# Patient Record
Sex: FEMALE | Race: WHITE | NOT HISPANIC OR LATINO | Employment: UNEMPLOYED
[De-identification: names, ages, dates, MRNs, and addresses within clinical notes are randomized per-mention and may not be internally consistent; named-entity substitution may affect disease eponyms.]

---

## 2019-05-03 ENCOUNTER — RECORDS - HEALTHEAST (OUTPATIENT)
Dept: ADMINISTRATIVE | Facility: OTHER | Age: 24
End: 2019-05-03

## 2023-12-03 ENCOUNTER — APPOINTMENT (OUTPATIENT)
Dept: RADIOLOGY | Facility: CLINIC | Age: 28
End: 2023-12-03
Attending: PHYSICIAN ASSISTANT
Payer: COMMERCIAL

## 2023-12-03 ENCOUNTER — HOSPITAL ENCOUNTER (EMERGENCY)
Facility: CLINIC | Age: 28
Discharge: HOME | End: 2023-12-03
Attending: STUDENT IN AN ORGANIZED HEALTH CARE EDUCATION/TRAINING PROGRAM | Admitting: STUDENT IN AN ORGANIZED HEALTH CARE EDUCATION/TRAINING PROGRAM
Payer: COMMERCIAL

## 2023-12-03 ENCOUNTER — APPOINTMENT (OUTPATIENT)
Dept: CT IMAGING | Facility: CLINIC | Age: 28
End: 2023-12-03
Attending: PHYSICIAN ASSISTANT
Payer: COMMERCIAL

## 2023-12-03 VITALS
HEIGHT: 59 IN | RESPIRATION RATE: 20 BRPM | WEIGHT: 195 LBS | DIASTOLIC BLOOD PRESSURE: 66 MMHG | TEMPERATURE: 99.6 F | OXYGEN SATURATION: 94 % | BODY MASS INDEX: 39.31 KG/M2 | SYSTOLIC BLOOD PRESSURE: 116 MMHG | HEART RATE: 92 BPM

## 2023-12-03 DIAGNOSIS — B95.0 GROUP A STREPTOCOCCAL INFECTION: Primary | ICD-10-CM

## 2023-12-03 DIAGNOSIS — J02.9 PHARYNGITIS: ICD-10-CM

## 2023-12-03 PROBLEM — J45.20 MILD INTERMITTENT ASTHMA WITHOUT COMPLICATION: Status: ACTIVE | Noted: 2022-03-18

## 2023-12-03 LAB
ANION GAP SERPL CALCULATED.3IONS-SCNC: 12 MMOL/L (ref 7–15)
BASOPHILS # BLD AUTO: 0.1 10E3/UL (ref 0–0.2)
BASOPHILS NFR BLD AUTO: 0 %
BUN SERPL-MCNC: 4.7 MG/DL (ref 6–20)
CALCIUM SERPL-MCNC: 9.5 MG/DL (ref 8.6–10)
CHLORIDE SERPL-SCNC: 98 MMOL/L (ref 98–107)
CREAT SERPL-MCNC: 0.47 MG/DL (ref 0.51–0.95)
D DIMER PPP FEU-MCNC: 0.57 UG/ML FEU (ref 0–0.5)
DEPRECATED HCO3 PLAS-SCNC: 26 MMOL/L (ref 22–29)
EGFRCR SERPLBLD CKD-EPI 2021: >90 ML/MIN/1.73M2
EOSINOPHIL # BLD AUTO: 0.2 10E3/UL (ref 0–0.7)
EOSINOPHIL NFR BLD AUTO: 1 %
ERYTHROCYTE [DISTWIDTH] IN BLOOD BY AUTOMATED COUNT: 11.8 % (ref 10–15)
FLUAV RNA SPEC QL NAA+PROBE: NEGATIVE
FLUBV RNA RESP QL NAA+PROBE: NEGATIVE
GLUCOSE SERPL-MCNC: 155 MG/DL (ref 70–99)
GROUP A STREP BY PCR: DETECTED
HCG INTACT+B SERPL-ACNC: <1 MIU/ML
HCT VFR BLD AUTO: 35.5 % (ref 35–47)
HGB BLD-MCNC: 12.2 G/DL (ref 11.7–15.7)
IMM GRANULOCYTES # BLD: 0.1 10E3/UL
IMM GRANULOCYTES NFR BLD: 1 %
LACTATE SERPL-SCNC: 1.9 MMOL/L (ref 0.7–2)
LYMPHOCYTES # BLD AUTO: 1.2 10E3/UL (ref 0.8–5.3)
LYMPHOCYTES NFR BLD AUTO: 6 %
MCH RBC QN AUTO: 30.7 PG (ref 26.5–33)
MCHC RBC AUTO-ENTMCNC: 34.4 G/DL (ref 31.5–36.5)
MCV RBC AUTO: 89 FL (ref 78–100)
MONOCYTES # BLD AUTO: 0.8 10E3/UL (ref 0–1.3)
MONOCYTES NFR BLD AUTO: 5 %
MONOCYTES NFR BLD AUTO: NEGATIVE %
NEUTROPHILS # BLD AUTO: 16.5 10E3/UL (ref 1.6–8.3)
NEUTROPHILS NFR BLD AUTO: 87 %
NRBC # BLD AUTO: 0 10E3/UL
NRBC BLD AUTO-RTO: 0 /100
PLATELET # BLD AUTO: 328 10E3/UL (ref 150–450)
POTASSIUM SERPL-SCNC: 3.5 MMOL/L (ref 3.4–5.3)
RBC # BLD AUTO: 3.97 10E6/UL (ref 3.8–5.2)
RSV RNA SPEC NAA+PROBE: NEGATIVE
SARS-COV-2 RNA RESP QL NAA+PROBE: NEGATIVE
SODIUM SERPL-SCNC: 136 MMOL/L (ref 135–145)
WBC # BLD AUTO: 18.9 10E3/UL (ref 4–11)

## 2023-12-03 PROCEDURE — 36415 COLL VENOUS BLD VENIPUNCTURE: CPT | Performed by: PHYSICIAN ASSISTANT

## 2023-12-03 PROCEDURE — 84702 CHORIONIC GONADOTROPIN TEST: CPT | Performed by: PHYSICIAN ASSISTANT

## 2023-12-03 PROCEDURE — 96365 THER/PROPH/DIAG IV INF INIT: CPT | Mod: 59

## 2023-12-03 PROCEDURE — 71275 CT ANGIOGRAPHY CHEST: CPT

## 2023-12-03 PROCEDURE — 96375 TX/PRO/DX INJ NEW DRUG ADDON: CPT | Mod: 59

## 2023-12-03 PROCEDURE — 258N000003 HC RX IP 258 OP 636: Performed by: PHYSICIAN ASSISTANT

## 2023-12-03 PROCEDURE — 86308 HETEROPHILE ANTIBODY SCREEN: CPT | Performed by: PHYSICIAN ASSISTANT

## 2023-12-03 PROCEDURE — 87637 SARSCOV2&INF A&B&RSV AMP PRB: CPT | Performed by: STUDENT IN AN ORGANIZED HEALTH CARE EDUCATION/TRAINING PROGRAM

## 2023-12-03 PROCEDURE — 83605 ASSAY OF LACTIC ACID: CPT | Performed by: PHYSICIAN ASSISTANT

## 2023-12-03 PROCEDURE — 87637 SARSCOV2&INF A&B&RSV AMP PRB: CPT | Performed by: EMERGENCY MEDICINE

## 2023-12-03 PROCEDURE — 250N000011 HC RX IP 250 OP 636: Performed by: STUDENT IN AN ORGANIZED HEALTH CARE EDUCATION/TRAINING PROGRAM

## 2023-12-03 PROCEDURE — 85025 COMPLETE CBC W/AUTO DIFF WBC: CPT | Performed by: PHYSICIAN ASSISTANT

## 2023-12-03 PROCEDURE — 80048 BASIC METABOLIC PNL TOTAL CA: CPT | Performed by: PHYSICIAN ASSISTANT

## 2023-12-03 PROCEDURE — 96361 HYDRATE IV INFUSION ADD-ON: CPT

## 2023-12-03 PROCEDURE — 87651 STREP A DNA AMP PROBE: CPT | Performed by: EMERGENCY MEDICINE

## 2023-12-03 PROCEDURE — 87651 STREP A DNA AMP PROBE: CPT | Performed by: STUDENT IN AN ORGANIZED HEALTH CARE EDUCATION/TRAINING PROGRAM

## 2023-12-03 PROCEDURE — 99285 EMERGENCY DEPT VISIT HI MDM: CPT | Mod: 25

## 2023-12-03 PROCEDURE — 250N000011 HC RX IP 250 OP 636: Mod: JZ | Performed by: PHYSICIAN ASSISTANT

## 2023-12-03 PROCEDURE — 85379 FIBRIN DEGRADATION QUANT: CPT | Performed by: PHYSICIAN ASSISTANT

## 2023-12-03 PROCEDURE — 71046 X-RAY EXAM CHEST 2 VIEWS: CPT

## 2023-12-03 PROCEDURE — 250N000013 HC RX MED GY IP 250 OP 250 PS 637: Performed by: PHYSICIAN ASSISTANT

## 2023-12-03 RX ORDER — IOPAMIDOL 755 MG/ML
100 INJECTION, SOLUTION INTRAVASCULAR ONCE
Status: COMPLETED | OUTPATIENT
Start: 2023-12-03 | End: 2023-12-03

## 2023-12-03 RX ORDER — CEFTRIAXONE 1 G/1
1 INJECTION, POWDER, FOR SOLUTION INTRAMUSCULAR; INTRAVENOUS ONCE
Status: COMPLETED | OUTPATIENT
Start: 2023-12-03 | End: 2023-12-03

## 2023-12-03 RX ORDER — ONDANSETRON 2 MG/ML
4 INJECTION INTRAMUSCULAR; INTRAVENOUS ONCE
Status: COMPLETED | OUTPATIENT
Start: 2023-12-03 | End: 2023-12-03

## 2023-12-03 RX ORDER — CEFDINIR 300 MG/1
300 CAPSULE ORAL 2 TIMES DAILY
Qty: 20 CAPSULE | Refills: 0 | Status: SHIPPED | OUTPATIENT
Start: 2023-12-03 | End: 2023-12-13

## 2023-12-03 RX ORDER — IBUPROFEN 600 MG/1
600 TABLET, FILM COATED ORAL ONCE
Status: COMPLETED | OUTPATIENT
Start: 2023-12-03 | End: 2023-12-03

## 2023-12-03 RX ORDER — DEXAMETHASONE SODIUM PHOSPHATE 4 MG/ML
4 INJECTION, SOLUTION INTRA-ARTICULAR; INTRALESIONAL; INTRAMUSCULAR; INTRAVENOUS; SOFT TISSUE ONCE
Status: COMPLETED | OUTPATIENT
Start: 2023-12-03 | End: 2023-12-03

## 2023-12-03 RX ADMIN — ONDANSETRON 4 MG: 2 INJECTION INTRAMUSCULAR; INTRAVENOUS at 16:55

## 2023-12-03 RX ADMIN — IBUPROFEN 600 MG: 600 TABLET ORAL at 16:55

## 2023-12-03 RX ADMIN — SODIUM CHLORIDE 1000 ML: 9 INJECTION, SOLUTION INTRAVENOUS at 16:53

## 2023-12-03 RX ADMIN — CEFTRIAXONE 1 G: 1 INJECTION, POWDER, FOR SOLUTION INTRAMUSCULAR; INTRAVENOUS at 17:32

## 2023-12-03 RX ADMIN — DEXAMETHASONE SODIUM PHOSPHATE 4 MG: 4 INJECTION, SOLUTION INTRAMUSCULAR; INTRAVENOUS at 16:55

## 2023-12-03 RX ADMIN — IOPAMIDOL 90 ML: 755 INJECTION, SOLUTION INTRAVENOUS at 19:35

## 2023-12-03 ASSESSMENT — ACTIVITIES OF DAILY LIVING (ADL)
ADLS_ACUITY_SCORE: 35
ADLS_ACUITY_SCORE: 35

## 2023-12-03 NOTE — ED TRIAGE NOTES
Arrives to ED with c/o sore throat that has been intermittent beginning Thanksgiving. Constant last 2-3 days. +fever. Took tylenol at noon today. Reports cough with green sputum. Reports green nasal drainage. Took home COVID test, negative.      Triage Assessment (Adult)       Row Name 12/03/23 1554          Triage Assessment    Airway WDL WDL        Respiratory WDL    Respiratory WDL WDL        Skin Circulation/Temperature WDL    Skin Circulation/Temperature WDL WDL        Cardiac WDL    Cardiac WDL WDL        Peripheral/Neurovascular WDL    Peripheral Neurovascular WDL WDL        Cognitive/Neuro/Behavioral WDL    Cognitive/Neuro/Behavioral WDL WDL

## 2023-12-03 NOTE — ED PROVIDER NOTES
"Emergency Department Midlevel Supervisory Note     I personally saw the patient and performed a substantive portion of the visit including all aspects of the medical decision making.    ED Course:  5:07 PM Dede Elmore PA-C staffed patient with me. I agree with their assessment and plan of management, and I will see the patient.  5:13 PM I met with the patient to introduce myself, gather additional history, perform my initial exam, and discuss the plan.     Brief HPI:     Ct Madsen is a 28 year old female who presents for evaluation of a sore throat.      The patient reports the onset of sore throat and a productive cough ten days ago (11/23), which have persisted since their onset. She states that she has been losing her voice and has had difficulty swallowing due to her sore throat, and has been coughing up green phlegm. Today she saw the onset of a fever of 100.7. She took tylenol around 1200 (~4.5 hours ago). She endorses a decreased appetite, nausea, and an episode of vomiting after she tried to eat, as well as mild shortness of breath, rhinorrhea, and congestion. The patient denies chest pain, back pain, diarrhea, abdominal pain, and any other symptoms or complaints at this time.     I, Kinga Burgess, am serving as a scribe to document services personally performed by Toby Singh M.D., based on my observations and the provider's statements to me.   I, Toby Singh M.D. attest that Kinga Burgess was acting in a scribe capacity, has observed my performance of the services and has documented them in accordance with my direction.    Brief Physical Exam: /66   Pulse 92   Temp 99.6  F (37.6  C) (Oral)   Resp 20   Ht 1.499 m (4' 11\")   Wt 88.5 kg (195 lb)   LMP 11/08/2023 (Approximate)   SpO2 94%   BMI 39.39 kg/m    Constitutional:  Alert, in no acute distress  EYES: Conjunctivae clear  HENT:  Atraumatic, normocephalic  Respiratory:  Respirations even, unlabored, in no " acute respiratory distress  Cardiovascular:  Regular rate and rhythm, good peripheral perfusion  GI: Soft, nondistended, nontender, no palpable masses, no rebound, no guarding   Musculoskeletal:  No edema. No cyanosis. Range of motion major extremities intact.    Integument: Warm, Dry, No erythema, No rash.   Neurologic:  Alert & oriented, no focal deficits noted  Psych: Normal mood and affect     MDM:    ED Course as of 12/03/23 2209   Sun Dec 03, 2023   1707 Strep positive, fever, sore throat, tachycardic with leukocytosis at 19k. Will give abx, fluids, and obtain lactic.   1743 Evaluated the patient, no evidence of peritonsillar abscess.  Tachycardia improving.  Lactic acid normal.  Will give dose of ceftriaxone here, and likely discharge to home.  Dimer positive, will obtain CT PE.  Consulted with ED pharmacy regarding the use of ceftriaxone, who agrees with this plan.   1745 Chest x-ray negative for pneumonia.   2001 No PE, vitals improved, will discharge with abx       1. Group A streptococcal infection    2. Pharyngitis        Labs and Imaging:  Results for orders placed or performed during the hospital encounter of 12/03/23   Chest XR,  PA & LAT    Impression    IMPRESSION: Negative chest.   CT Chest Pulmonary Embolism w Contrast    Impression    IMPRESSION:  1.  No pulmonary embolism.   Symptomatic Influenza A/B, RSV, & SARS-CoV2 PCR (COVID-19) Nasopharyngeal    Specimen: Nasopharyngeal; Swab   Result Value Ref Range    Influenza A PCR Negative Negative    Influenza B PCR Negative Negative    RSV PCR Negative Negative    SARS CoV2 PCR Negative Negative   Basic metabolic panel   Result Value Ref Range    Sodium 136 135 - 145 mmol/L    Potassium 3.5 3.4 - 5.3 mmol/L    Chloride 98 98 - 107 mmol/L    Carbon Dioxide (CO2) 26 22 - 29 mmol/L    Anion Gap 12 7 - 15 mmol/L    Urea Nitrogen 4.7 (L) 6.0 - 20.0 mg/dL    Creatinine 0.47 (L) 0.51 - 0.95 mg/dL    GFR Estimate >90 >60 mL/min/1.73m2    Calcium 9.5 8.6 -  10.0 mg/dL    Glucose 155 (H) 70 - 99 mg/dL   D dimer quantitative   Result Value Ref Range    D-Dimer Quantitative 0.57 (H) 0.00 - 0.50 ug/mL FEU   Result Value Ref Range    Mononucleosis Screen Negative Negative   CBC with platelets and differential   Result Value Ref Range    WBC Count 18.9 (H) 4.0 - 11.0 10e3/uL    RBC Count 3.97 3.80 - 5.20 10e6/uL    Hemoglobin 12.2 11.7 - 15.7 g/dL    Hematocrit 35.5 35.0 - 47.0 %    MCV 89 78 - 100 fL    MCH 30.7 26.5 - 33.0 pg    MCHC 34.4 31.5 - 36.5 g/dL    RDW 11.8 10.0 - 15.0 %    Platelet Count 328 150 - 450 10e3/uL    % Neutrophils 87 %    % Lymphocytes 6 %    % Monocytes 5 %    % Eosinophils 1 %    % Basophils 0 %    % Immature Granulocytes 1 %    NRBCs per 100 WBC 0 <1 /100    Absolute Neutrophils 16.5 (H) 1.6 - 8.3 10e3/uL    Absolute Lymphocytes 1.2 0.8 - 5.3 10e3/uL    Absolute Monocytes 0.8 0.0 - 1.3 10e3/uL    Absolute Eosinophils 0.2 0.0 - 0.7 10e3/uL    Absolute Basophils 0.1 0.0 - 0.2 10e3/uL    Absolute Immature Granulocytes 0.1 <=0.4 10e3/uL    Absolute NRBCs 0.0 10e3/uL   Lactic acid whole blood   Result Value Ref Range    Lactic Acid 1.9 0.7 - 2.0 mmol/L   HCG quantitative pregnancy (blood)   Result Value Ref Range    hCG Quantitative <1 <5 mIU/mL   Group A Streptococcus PCR Throat Swab    Specimen: Throat; Swab   Result Value Ref Range    Group A strep by PCR Detected (A) Not Detected     I have reviewed the relevant laboratory and radiology studies    Procedures:  I was present for the key portions of this procedure: none    Toby Singh M.D.  [facility] EMERGENCY ROOM  [address] [address] [phone]       Toby Singh MD  12/03/23 1104

## 2023-12-03 NOTE — Clinical Note
Ct Madsen was seen and treated in our emergency department on 12/3/2023.  She may return to work on 12/05/2023.       If you have any questions or concerns, please don't hesitate to call.      Dede Elmore PA-C

## 2023-12-03 NOTE — ED PROVIDER NOTES
EMERGENCY DEPARTMENT ENCOUNTER   NAME: Ct Madsen ; AGE: 28 year old female ; YOB: 1995 ; MRN: [id number] ; PCP: No primary care provider on file.     Evaluation Date & Time: 12/3/2023  4:01 PM    ED Provider: Dede Elmore PA-C    CHIEF COMPLAINT     Pharyngitis      FINAL ASSESSMENT     Group A strep  Pharyngitis     ED COURSE, MEDICAL DECISION MAKING, PLAN     ED course     4:18 PM I met with the patient for the initial interview and physical examination. Discussed plan for treatment and workup in the ED. Plan for labs, x-rays, meds, fluids.  4:54 PM: Positive group A strep.  5:05 PM: WBC 18.9. SIRS criteria met with HR and WBCs. Lactate ordered. Ceftriaxone ordered.   5:07 PM I staffed the patient with my colleague, Dr. Singh, who will evaluate the patient. Dr. Singh was updated on the patient throughout ED course.    5:20 PM D dimer marginally elevated at 0.57.  I rechecked and updated the patient. Plan for CT PE study.   8:02 PM I rechecked and updated the patient. I discussed the plan for discharge with the patient, and patient is agreeable. We discussed supportive cares at home and reasons for return to the ER including new or worsening symptoms - all questions and concerns addressed. Patient to be discharged by RN.     PPE: Provider wore gloves, eye protection, surgical cap, and paper mask.    _____________________________________________________________________    Ct is a 28-year-old female presenting for sore throat, cough, nasal congestion x1 week.  Fever started yesterday.  Works in a , no known specific ill contacts.    On exam patient is ill appearing, but in no acute distress. Nasal congestion present. Pharynx is erythematous, edematous and with palatal petechiae. Lungs sound clear. HR is elevated to 141, O2 at 93% on RA, temp 99.6, RR 20, /78.     Concern for viral URI, pneumonia, strep pharyngitis, peritonsillar abscess, mononucleosis, PE.     Labs  remarkable for an elevated white blood cell count 18.9.  Group A strep positive.  D-dimer marginally elevated at 0.57.  Blood glucose elevated to 155.    Chest x-ray independently interpreted by myself without any evidence of infiltrate or effusion.  Radiologist read agrees.    With patient's elevated white blood cell count and elevated heart rate, she does meet SIRS criteria.  1 L of normal saline started.  1 g of IV Rocephin started.  Patient was also provided with a dose of ibuprofen since she recently had some Tylenol.  She was also given a dose of Decadron.  Lactic acid ordered and is WNL.     D-dimer was obtained given her complaint of shortness of breath and her elevated heart rate and inability to PERC her out.  Unfortunately this was marginally elevated at 0.57 despite my low concern for PE.  Discussed obtaining chest CT PE rule out with patient and she is agreeable with it. These images show no PE.     Overall, signs and symptoms appear to be consistent with strep pharyngitis. She did meet SIRS criteria with the elevated HR and elevated WBC and therefore a lactic acid was ordered which was WNL. No end organ damage. With interventions, patient's heart rate decreased towards normal steadily.  She also reports feeling improved and certainly looks better than initial presentation.     Vitals normalized by the time her PE scan was completed.    With the negative imaging and significant clinical improvement, I think it is reasonable to discharge patient home with 10 days of oral antibiotics and supportive cares at home. She is also agreeable with this.     With her allergy to Amoxicillin, will cover with Cefdinir.     Contagion precautions reviewed.  Supportive cares discussed.    Indications for reevaluation discussed with patient.        *All pertinent lab & imaging studies independently reviewed. (See chart for details)   *Discussed the results of all the tests and plan with patient and family/guardians.    *All questions were answered.   *The patient and/or family/guardian acknowledged understanding and was agreeable with the care plan.      History:  Supplemental history from: Documented in chart, if applicable  External Record(s) reviewed: Documented in chart, if applicable.    Work Up:  Chart documentation includes differentials considered and any EKGs or imaging independently interpreted by provider, where specified.  In additional to work up documented, I considered the following work up: Documented in chart, if applicable.    External consultation:  Discussion of management with another provider: Documented in chart, if applicable    Complicating factors:  Care impacted by chronic illness: N/A  Care affected by social determinants of health: N/A    Disposition considerations: Discharge. I prescribed additional prescription strength medication(s) as charted. See documentation for any additional details.    FINAL IMPRESSION:    ICD-10-CM    1. Group A streptococcal infection  B95.0       2. Pharyngitis  J02.9             MEDICATIONS GIVEN IN THE EMERGENCY DEPARTMENT:  Medications   sodium chloride 0.9% BOLUS 1,000 mL (0 mLs Intravenous Stopped 12/3/23 1812)   ibuprofen (ADVIL/MOTRIN) tablet 600 mg (600 mg Oral $Given 12/3/23 1655)   ondansetron (ZOFRAN) injection 4 mg (4 mg Intravenous $Given 12/3/23 1655)   dexAMETHasone (DECADRON) injection 4 mg (4 mg Intravenous $Given 12/3/23 1655)   cefTRIAXone (ROCEPHIN) 1 g vial to attach to  mL bag for ADULTS or NS 50 mL bag for PEDS (0 g Intravenous Stopped 12/3/23 1812)   iopamidol (ISOVUE-370) solution 100 mL (90 mLs Intravenous $Given 12/3/23 1935)         NEW PRESCRIPTIONS STARTED AT TODAY'S ED VISIT:  New Prescriptions    CEFDINIR (OMNICEF) 300 MG CAPSULE    Take 1 capsule (300 mg) by mouth 2 times daily for 10 days       HISTORY OF PRESENT ILLNESS   Patient information was obtained from: the patient    Use of Intrepreter: N/A     Ct Madsen is a 28  "year old female with a pertinent history of asthma who presents to the ED by private vehicle for evaluation of sore throat.     The patient reports the onset of sore throat and a productive cough ten days ago (11/23), which have persisted since its onset. She states that she has been losing her voice and has had difficulty swallowing due to her sore throat, and has been coughing up green phlegm. Today she saw the onset of a fever of 100.7. She took tylenol around 1200 (~4.5 hours ago). She endorses a decreased appetite, nausea, and an episode of vomiting after she tried to eat, as well as mild shortness of breath, rhinorrhea, and congestion. The patient denies chest pain, back pain, diarrhea, abdominal pain, and any other symptoms or complaints at this time.     MEDICAL HISTORY     History reviewed. No pertinent past medical history.    History reviewed. No pertinent surgical history.    History reviewed. No pertinent family history.         cefdinir (OMNICEF) 300 MG capsule          PHYSICAL EXAM     First Vitals:  Patient Vitals for the past 24 hrs:   BP Temp Temp src Pulse Resp SpO2 Height Weight   12/03/23 1910 -- -- -- 97 -- 95 % -- --   12/03/23 1850 -- -- -- 99 -- 94 % -- --   12/03/23 1840 -- -- -- 99 -- 95 % -- --   12/03/23 1835 -- -- -- 98 -- 95 % -- --   12/03/23 1816 -- -- -- 102 -- 95 % -- --   12/03/23 1746 -- -- -- 107 -- 96 % -- --   12/03/23 1716 -- -- -- 117 -- 96 % -- --   12/03/23 1705 -- -- -- 115 -- 97 % -- --   12/03/23 1700 -- -- -- 113 -- 96 % -- --   12/03/23 1552 117/78 99.6  F (37.6  C) Oral (!) 141 20 93 % 1.499 m (4' 11\") 88.5 kg (195 lb)         PHYSICAL EXAM:   Constitutional: Ill-appearing, but no acute distress.  Neuro: Awake and alert. No focal deficits.  Psych: Calm and cooperative.  Eyes: PERRL. EOMI. Conjunctivae clear. No crusting or mattering of the lids or lashes. No periocular redness or swelling.   Ears: TMs visualized and are normal. External canals clear.    Nose: Nasal " congestion present.  Mouth: Posterior pharynx is erythematous and edematous.  Some palatal petechiae present.  No exudates.  Voice is hoarse.  No trismus.  Handling own secretions. No uvula deviation.   Neck: FROM.   Lymph: Anterior cervical lymphadenopathy present.    Cardio: . Adequate perfusion to extremities. Regular rhythm. No murmurs.  Pulmonary: Oxygenating at 93% on room air.  No labored breathing. CTA b/l.  Upper extremities: Moves freely.    Lower extremities: Moves freely. No edema.   Skin: Natural color. Warm, dry, intact.       RESULTS     LAB:  All pertinent labs reviewed and interpreted  Labs Ordered and Resulted from Time of ED Arrival to Time of ED Departure   BASIC METABOLIC PANEL - Abnormal       Result Value    Sodium 136      Potassium 3.5      Chloride 98      Carbon Dioxide (CO2) 26      Anion Gap 12      Urea Nitrogen 4.7 (*)     Creatinine 0.47 (*)     GFR Estimate >90      Calcium 9.5      Glucose 155 (*)    D DIMER QUANTITATIVE - Abnormal    D-Dimer Quantitative 0.57 (*)    CBC WITH PLATELETS AND DIFFERENTIAL - Abnormal    WBC Count 18.9 (*)     RBC Count 3.97      Hemoglobin 12.2      Hematocrit 35.5      MCV 89      MCH 30.7      MCHC 34.4      RDW 11.8      Platelet Count 328      % Neutrophils 87      % Lymphocytes 6      % Monocytes 5      % Eosinophils 1      % Basophils 0      % Immature Granulocytes 1      NRBCs per 100 WBC 0      Absolute Neutrophils 16.5 (*)     Absolute Lymphocytes 1.2      Absolute Monocytes 0.8      Absolute Eosinophils 0.2      Absolute Basophils 0.1      Absolute Immature Granulocytes 0.1      Absolute NRBCs 0.0     GROUP A STREPTOCOCCUS PCR THROAT SWAB - Abnormal    Group A strep by PCR Detected (*)    INFLUENZA A/B, RSV, & SARS-COV2 PCR - Normal    Influenza A PCR Negative      Influenza B PCR Negative      RSV PCR Negative      SARS CoV2 PCR Negative     MONONUCLEOSIS SCREEN - Normal    Mononucleosis Screen Negative     LACTIC ACID WHOLE BLOOD -  Normal    Lactic Acid 1.9     HCG QUANTITATIVE PREGNANCY - Normal    hCG Quantitative <1         RADIOLOGY:  CT Chest Pulmonary Embolism w Contrast   Final Result   IMPRESSION:   1.  No pulmonary embolism.      Chest XR,  PA & LAT   Final Result   IMPRESSION: Negative chest.          ECG:  None.       PROCEDURES   None    I, Misti Navas, am serving as a scribe to document services personally performed by Dede Elmore PA-C, based on my observation and the provider's statements to me. IDede PA-C attest that Misti Navas is acting in a scribe capacity, has observed my performance of the services and has documented them in accordance with my direction.     Some or all of this documentation has been completed using dictation software and mild grammatical errors may be present. Please contact me with any concerns regarding this.       Dede Elmore PA-C  Emergency Medicine   [facility] EMERGENCY ROOM      Dede Elmore PA-C  12/03/23 2016

## 2023-12-03 NOTE — DISCHARGE INSTRUCTIONS
You have strep throat.  Please complete the antibiotic as prescribed.  You are considered contagious 12 to 24 hours after starting the antibiotic.  Please stay home from work during this time.  Drink plenty of fluids.  Alternate Tylenol and ibuprofen.    Your blood glucose was elevated here today at 155. Since this is not a fasting glucose level and you are currently ill, it is not overly helpful.  I recommend that you arrange a follow-up appointment with your primary care provider to follow-up on this and check your hemoglobin A1c level which is a more accurate way to check how your blood sugars have been over a period of time.

## 2023-12-23 ENCOUNTER — HEALTH MAINTENANCE LETTER (OUTPATIENT)
Age: 28
End: 2023-12-23

## 2025-01-12 ENCOUNTER — HEALTH MAINTENANCE LETTER (OUTPATIENT)
Age: 30
End: 2025-01-12